# Patient Record
Sex: FEMALE | URBAN - METROPOLITAN AREA
[De-identification: names, ages, dates, MRNs, and addresses within clinical notes are randomized per-mention and may not be internally consistent; named-entity substitution may affect disease eponyms.]

---
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
## 2025-01-13 ENCOUNTER — APPOINTMENT (OUTPATIENT)
Dept: URBAN - METROPOLITAN AREA CLINIC 152 | Facility: CLINIC | Age: 20
Setting detail: DERMATOLOGY
End: 2025-01-13

## 2025-01-13 DIAGNOSIS — L40.0 PSORIASIS VULGARIS: ICD-10-CM

## 2025-01-13 PROCEDURE — ? REFERRAL CORRESPONDENCE

## 2025-01-13 PROCEDURE — ? ORDER TESTS

## 2025-01-13 PROCEDURE — ? SKYRIZI INITIATION

## 2025-01-13 PROCEDURE — 99214 OFFICE O/P EST MOD 30 MIN: CPT

## 2025-01-13 PROCEDURE — ? PRESCRIPTION MEDICATION MANAGEMENT

## 2025-01-13 PROCEDURE — ? COUNSELING

## 2025-01-13 PROCEDURE — ? DIAGNOSIS COMMENT

## 2025-01-13 PROCEDURE — ? PRESCRIPTION

## 2025-01-13 RX ORDER — RISANKIZUMAB-RZAA 150 MG/ML
INJECTION SUBCUTANEOUS
Qty: 2 | Refills: 1 | Status: ERX

## 2025-01-13 RX ORDER — RISANKIZUMAB-RZAA 150 MG/ML
INJECTION SUBCUTANEOUS
Qty: 2 | Refills: 3 | Status: ERX | COMMUNITY
Start: 2025-01-13

## 2025-01-13 RX ADMIN — RISANKIZUMAB-RZAA: 150 INJECTION SUBCUTANEOUS at 00:00

## 2025-01-13 ASSESSMENT — BSA PSORIASIS: % BODY COVERED IN PSORIASIS: 20

## 2025-01-13 ASSESSMENT — PGA PSORIASIS: PGA PSORIASIS 2020: SEVERE

## 2025-01-13 NOTE — PROCEDURE: PRESCRIPTION MEDICATION MANAGEMENT
Render In Strict Bullet Format?: No
Detail Level: Zone
Initiate Treatment: Fluocinonide 0.05% cream BID for the body\\nSulfur wash QD \\nTacrolimus 0.1% ointment BID for the face

## 2025-01-13 NOTE — PROCEDURE: DIAGNOSIS COMMENT
Comment: Psoriasis, severe, over 20% BSA- flaring since stopping Stelara in 4/24 because of headaches and fatigue. Unable to maintain with topicals alone. - pt currently treating with Lidex cream, TAC 0.1% cream, tacrolimus 0.1 ointment and sulfur wash. Pt notes the headaches and fatigue has resolved off the Stellara ), but notes continued joint pain- (particularly the hands)- has been evaluated by rheumatology who does not think this is psoriatic arthritis and is on meloxicam. discussed restarting a biologic- discussed skyrizi vs taltz. Pt would like to try skyrizi. Discussed necessary lab work prior to starting. Will initiate the PA process. Lab slip provided.   FU 3 months.
Detail Level: Simple
Render Risk Assessment In Note?: yes

## 2025-01-13 NOTE — PROCEDURE: SKYRIZI INITIATION
Diagnosis (Required): Psoriasis
Pregnancy And Lactation Warning Text: The risk during pregnancy and breastfeeding is uncertain with this medication.
Is Soriatane Contraindicated?: No
Skyrizi Monitoring Guidelines: A yearly test for tuberculosis is required while taking Skyrizi.
Skyrizi Dosing: 150 mg SC week 0 and week 4 then every 12 weeks thereafter
Detail Level: Zone
Include Weight Question: Yes - Pounds

## 2025-01-13 NOTE — PROCEDURE: ORDER TESTS
Expected Date Of Service: 01/13/2025
Lab Facility: [id number]
Performing Laboratory: [id number]
Billing Type: Third-Party Bill
Bill For Surgical Tray: no

## 2025-01-18 RX ORDER — RISANKIZUMAB-RZAA 150 MG/ML
INJECTION SUBCUTANEOUS
Qty: 2 | Refills: 3 | Status: ERX

## 2025-01-18 RX ORDER — RISANKIZUMAB-RZAA 150 MG/ML
INJECTION SUBCUTANEOUS
Qty: 2 | Refills: 1 | Status: ERX

## 2025-04-28 ENCOUNTER — APPOINTMENT (OUTPATIENT)
Dept: URBAN - METROPOLITAN AREA CLINIC 152 | Facility: CLINIC | Age: 20
Setting detail: DERMATOLOGY
End: 2025-04-28

## 2025-04-28 DIAGNOSIS — L40.0 PSORIASIS VULGARIS: ICD-10-CM

## 2025-04-28 PROCEDURE — ? DIAGNOSIS COMMENT

## 2025-04-28 PROCEDURE — ? COUNSELING

## 2025-04-28 PROCEDURE — 99213 OFFICE O/P EST LOW 20 MIN: CPT

## 2025-04-28 PROCEDURE — ? SKYRIZI MONITORING

## 2025-04-28 NOTE — PROCEDURE: DIAGNOSIS COMMENT
Comment: Psoriasis, improvement from last visit-Previously used Stelara, but stopped 4/2024 due to headache and fatigue. Pt currently using Skyrizi 150mg q12 weeks and notes improvement in psoriasis (no active areas, just post inflammatory hyperpigmentation).  Will continue Skyrizi 150mg q12 weeks. FU before PA expires.
Detail Level: Simple
Render Risk Assessment In Note?: yes